# Patient Record
Sex: FEMALE | Race: WHITE | NOT HISPANIC OR LATINO | URBAN - METROPOLITAN AREA
[De-identification: names, ages, dates, MRNs, and addresses within clinical notes are randomized per-mention and may not be internally consistent; named-entity substitution may affect disease eponyms.]

---

## 2018-01-18 ENCOUNTER — EMERGENCY (EMERGENCY)
Facility: HOSPITAL | Age: 48
LOS: 1 days | Discharge: HOME | End: 2018-01-18
Attending: EMERGENCY MEDICINE | Admitting: EMERGENCY MEDICINE
Payer: COMMERCIAL

## 2018-01-18 VITALS
OXYGEN SATURATION: 98 % | RESPIRATION RATE: 16 BRPM | HEART RATE: 63 BPM | SYSTOLIC BLOOD PRESSURE: 109 MMHG | DIASTOLIC BLOOD PRESSURE: 70 MMHG | TEMPERATURE: 98 F

## 2018-01-18 VITALS
HEART RATE: 81 BPM | RESPIRATION RATE: 18 BRPM | OXYGEN SATURATION: 100 % | SYSTOLIC BLOOD PRESSURE: 128 MMHG | DIASTOLIC BLOOD PRESSURE: 86 MMHG

## 2018-01-18 DIAGNOSIS — S69.92XA UNSPECIFIED INJURY OF LEFT WRIST, HAND AND FINGER(S), INITIAL ENCOUNTER: ICD-10-CM
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
## 2018-01-18 DIAGNOSIS — S52.502A UNSPECIFIED FRACTURE OF THE LOWER END OF LEFT RADIUS, INITIAL ENCOUNTER FOR CLOSED FRACTURE: ICD-10-CM
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
## 2018-01-18 PROCEDURE — 96372 THER/PROPH/DIAG INJ SC/IM: CPT | Mod: XU

## 2018-01-18 PROCEDURE — 99285 EMERGENCY DEPT VISIT HI MDM: CPT | Mod: 25

## 2018-01-18 PROCEDURE — 25605 CLTX DST RDL FX/EPHYS SEP W/: CPT

## 2018-01-18 PROCEDURE — 73110 X-RAY EXAM OF WRIST: CPT

## 2018-01-18 PROCEDURE — 99284 EMERGENCY DEPT VISIT MOD MDM: CPT

## 2018-01-18 PROCEDURE — 73110 X-RAY EXAM OF WRIST: CPT | Mod: 26,76,LT

## 2018-01-18 RX ORDER — MORPHINE SULFATE 50 MG/1
2 CAPSULE, EXTENDED RELEASE ORAL ONCE
Qty: 0 | Refills: 0 | Status: DISCONTINUED | OUTPATIENT
Start: 2018-01-18 | End: 2018-01-18

## 2018-01-18 RX ORDER — OXYCODONE AND ACETAMINOPHEN 5; 325 MG/1; MG/1
1 TABLET ORAL ONCE
Qty: 0 | Refills: 0 | Status: DISCONTINUED | OUTPATIENT
Start: 2018-01-18 | End: 2018-01-18

## 2018-01-18 RX ORDER — IBUPROFEN 200 MG
1 TABLET ORAL
Qty: 30 | Refills: 0 | OUTPATIENT
Start: 2018-01-18

## 2018-01-18 RX ADMIN — OXYCODONE AND ACETAMINOPHEN 1 TABLET(S): 5; 325 TABLET ORAL at 17:48

## 2018-01-18 RX ADMIN — MORPHINE SULFATE 2 MILLIGRAM(S): 50 CAPSULE, EXTENDED RELEASE ORAL at 18:30

## 2018-01-18 RX ADMIN — OXYCODONE AND ACETAMINOPHEN 1 TABLET(S): 5; 325 TABLET ORAL at 18:36

## 2018-01-18 NOTE — PROCEDURE NOTE - NSPERIPVASCEVA_GEN_A_CORE
no paresthesia/fingers/toes warm to touch/pre-application: responses intact/capillary refill time < 2 seconds/no cyanosis of extremity/post-application: responses intact

## 2018-01-18 NOTE — PROCEDURE NOTE - NSPOSTCAREGUIDE_GEN_A_CORE
Verbal/written post procedure instructions were given to patient/caregiver/Elevate the injured extremity as instructed/Keep the cast/splint/dressing clean and dry

## 2018-01-18 NOTE — CONSULT NOTE ADULT - ASSESSMENT
47F s/p SLY w/ L colles fx  -Closed reduced, splinted in ED  -NWB LUE  -pain control, sling for comfort  -Pt counseled on need for possible surgery and rec close f/u when pt returns to [place]  -Dr. Tilley present and discussed plan w/ patient

## 2018-01-18 NOTE — ED ADULT NURSE NOTE - CHPI ED SYMPTOMS NEG
no loss of consciousness/no abrasion/no tingling/no confusion/no fever/no vomiting/no numbness/no weakness/no bleeding

## 2018-01-18 NOTE — ED PROVIDER NOTE - ATTENDING CONTRIBUTION TO CARE
trip and fall onto wrist.  wrist xray: impacted/displaced distal radius fracture read by Dr. Neal.  ortho consulted.  reduction and splinting done by orthopedics.  plans to f/u at home with orthopedist in [place]

## 2018-01-18 NOTE — CONSULT NOTE ADULT - SUBJECTIVE AND OBJECTIVE BOX
47F RHD visiting from [place] p/w L wrist pain s/p FOOSH while ice skating in [facility]. Pt c/o pain/swelling to L wrist.  Denies syncope/LOC, other injury/trauma, numbness/weakness. Ortho consulted for L DR colles fx.    Vital Signs Last 24 Hrs  T(C): 36.6 (18 Jan 2018 16:57), Max: 36.6 (18 Jan 2018 16:57)  T(F): 97.8 (18 Jan 2018 16:57), Max: 97.8 (18 Jan 2018 16:57)  HR: 81 (18 Jan 2018 18:33) (63 - 81)  BP: 128/86 (18 Jan 2018 18:33) (109/70 - 128/86)  BP(mean): --  RR: 18 (18 Jan 2018 18:33) (16 - 18)  SpO2: 100% (18 Jan 2018 18:33) (98% - 100%)    NAD, AOx3, comfortable  wrist: +gross deformity/swelling, skin intact, decreased ROM 2/2 pain  Motor: AIN/MN/UN/PIN/RN  Sensory: SILT  Pulses: wwp, RP 2+    XR L wrist: dorsally angulated, distal radius fx

## 2018-01-18 NOTE — ED ADULT NURSE NOTE - OBJECTIVE STATEMENT
47 year old female patient with c/o of L wrist pain after falling while ice skating.  No distress noted.  Breathing easily and unlabored.  Wrist appears red & swollen.

## 2018-01-18 NOTE — ED PROVIDER NOTE - OBJECTIVE STATEMENT
47F visit [facility] presents w/ L wrist pain s/p forward facing fall. She fell forward, arms extended.  Some people helped her up but her wrist was acutely tender, swollen, and she had difficulty moving it.  She has no other symptoms.  Denies loss of sensation.